# Patient Record
Sex: MALE | Race: WHITE | NOT HISPANIC OR LATINO | Employment: STUDENT | URBAN - NONMETROPOLITAN AREA
[De-identification: names, ages, dates, MRNs, and addresses within clinical notes are randomized per-mention and may not be internally consistent; named-entity substitution may affect disease eponyms.]

---
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
## 2022-08-19 ENCOUNTER — HOSPITAL ENCOUNTER (OUTPATIENT)
Dept: GENERAL RADIOLOGY | Facility: OTHER | Age: 22
Discharge: HOME | End: 2022-08-19
Attending: NURSE PRACTITIONER
Payer: COMMERCIAL

## 2022-08-19 ENCOUNTER — HOSPITAL ENCOUNTER (OUTPATIENT)
Dept: MRI IMAGING | Facility: OTHER | Age: 22
Discharge: HOME | End: 2022-08-19
Attending: NURSE PRACTITIONER
Payer: COMMERCIAL

## 2022-08-19 DIAGNOSIS — M25.311 INSTABILITY OF RIGHT SHOULDER JOINT: ICD-10-CM

## 2022-08-19 DIAGNOSIS — S43.439A SLAP (SUPERIOR GLENOID LABRUM LESION): ICD-10-CM

## 2022-08-19 PROCEDURE — 250N000009 HC RX 250: Performed by: STUDENT IN AN ORGANIZED HEALTH CARE EDUCATION/TRAINING PROGRAM

## 2022-08-19 PROCEDURE — 73222 MRI JOINT UPR EXTREM W/DYE: CPT | Mod: RT

## 2022-08-19 PROCEDURE — 255N000002 HC RX 255 OP 636: Performed by: STUDENT IN AN ORGANIZED HEALTH CARE EDUCATION/TRAINING PROGRAM

## 2022-08-19 PROCEDURE — 23350 INJECTION FOR SHOULDER X-RAY: CPT

## 2022-08-19 PROCEDURE — A9575 INJ GADOTERATE MEGLUMI 0.1ML: HCPCS | Performed by: STUDENT IN AN ORGANIZED HEALTH CARE EDUCATION/TRAINING PROGRAM

## 2022-08-19 RX ORDER — GADOTERATE MEGLUMINE 376.9 MG/ML
0.1 INJECTION INTRAVENOUS ONCE
Status: COMPLETED | OUTPATIENT
Start: 2022-08-19 | End: 2022-08-19

## 2022-08-19 RX ORDER — LIDOCAINE HYDROCHLORIDE 10 MG/ML
1 INJECTION, SOLUTION INFILTRATION; PERINEURAL ONCE
Status: COMPLETED | OUTPATIENT
Start: 2022-08-19 | End: 2022-08-19

## 2022-08-19 RX ADMIN — IOHEXOL 1 ML: 240 INJECTION, SOLUTION INTRATHECAL; INTRAVASCULAR; INTRAVENOUS; ORAL at 10:50

## 2022-08-19 RX ADMIN — LIDOCAINE HYDROCHLORIDE 3 ML: 10 INJECTION, SOLUTION INFILTRATION; PERINEURAL at 10:51

## 2022-08-19 RX ADMIN — GADOTERATE MEGLUMINE 0.1 ML: 376.9 INJECTION INTRAVENOUS at 10:51

## 2025-04-30 NOTE — PROGRESS NOTES
"Patient Information     Patient Name MRN Sex Daniel Dowling [id number] Male 2000      Progress Notes by Artis Monroy PT at 2017  7:42 AM     Author:  Artis Monroy PT Service:  (none) Author Type:  PT- Physical Therapist     Filed:  2017  8:12 AM Date of Service:  2017  7:42 AM Status:  Signed     :  Artis Monroy PT (PT- Physical Therapist)            [facility]  Outpatient PT - Daily Notes     Date of Service: 2017      Visit 4/10    Patient Name: Daniel Orosco   YOB: 2000   Referring MD/Provider: Dr. Contreras   Medical and Treatment Diagnosis: Achilles tendinitis of both lower extremities   PT Treatment Diagnosis: Diminished balance and pain of bilateral ankles.   Insurance:  Care   Start of Service: 16  Certification Dates: Start of Service: 16  Medicare/MA Re-Cert Due: 17           Subjective      Patient reports symptoms have improved 75% overall.  He still has a few episodes of pain after skiing.  States the use of ice does help decrease symptoms.  He has used orthotics in his ski boots which has been helpful. Reports no difficulty with the current HEP.      Rates pain: 0 /10            Objective    Today's Intervention:    6 minute warm up on the bike. Seat 5    -Star diagram  -Squats: focus on keeping chest up and maintaining knees over toes, next added squat on the Bosu ball x 10 with 5\" hold focus on technique  -Crab walk with green theraband    Added:   -Sidelying hip abduction x 15 B     Ice massage: pt performed bilateral ice massage on Roney's tendon and insertion.     Home Exercise Program:    -Towel scrunches  -Single leg stance with arch in foot  -Ice massage after ski practice  -Single leg stance (SLS) while maintaining arch on both legs while doing bilateral shoulder extension against red TB   -SLS while maintaining arch on both legs while doing alternating shoulder extension against red TB " Report and care given to Sari Colvin RN.   "  -SLS while maintaining arch on both legs while doing diagonal pattern from shoulder down to hip against doubled-up red TB   -Star diagram: instructed to keep chest up   -Squats: focus on keeping chest up and maintaining knees over toes  -Crab walk with green theraband  Sidelying hip abduction     Assessment    Therapist Assessment / Clinical Impression:  Signs and symptoms consistent with Achilles tendinitis of both lower extremities     The patient is appropriate for physical therapy to address their pain, functional limitations, and physical impairments.     Functional Impairment(s): See subjective on initial evaluation and Functional Assessment / Summary Report from [facility].    Physical Impairment(s): Diminished balance and pain of bilateral ankles.           Goals:  Functional Short Term Goals (4 weeks):   Patient will walk with a normal gait pattern at the end of the day when his pain is increased.   Patient will be able to maintain an arch while in single leg stance bilaterally for 30 seconds.   Patient will report that he has no pain with skate skiing uphill during ski practice.       Functional Long Term Goals (8 weeks):   Patient will self-manage symptoms and return to prior level of function.    Patient will be independent in their Home Exercise Program.  Patient will report less than 2/10 pain at the end of the day while ambulating.   Patient will demonstrate a 4\" step down bilaterally without valgus knee positioning.   Patient will be able to maintain an arch while in single leg stance while standing on airex foam bilaterally for 20 seconds.         Patient participated in goal selection and understand(s) the plan of care: Yes   Patient Potential for Achieving Desired Outcome: Good       Response to Intervention:  Good response to treatment. Patient verbalizes understanding of HEP.         Plan    Treatment Plan:      Frequency:   16 visits    Duration of Treatment: 8 weeks    Planned Interventions:  "   Home Exercise Program development  Therapeutic Exercise (ROM & Strengthening)  Therapeutic Activities  Neuromuscular Re-education  Manual Therapy  Ultrasound  E-Stim  Gait Training  Hot Packs / Cold Pack Modalities  Vasopneumatic Compression with Cold Therapy ( Game Ready )  Iontophoresis with Dexamethasone    Plan for next visit:  Progress balance and strengthening exercises as tolerated, utilize manual therapy and modalities as indicated.

## (undated) RX ORDER — LIDOCAINE HYDROCHLORIDE 10 MG/ML
INJECTION, SOLUTION INFILTRATION; PERINEURAL
Status: DISPENSED
Start: 2022-08-19

## (undated) RX ORDER — KETOROLAC TROMETHAMINE 30 MG/ML
INJECTION, SOLUTION INTRAMUSCULAR; INTRAVENOUS
Status: DISPENSED
Start: 2021-05-22

## (undated) RX ORDER — LIDOCAINE HYDROCHLORIDE 10 MG/ML
INJECTION, SOLUTION INFILTRATION; PERINEURAL
Status: DISPENSED
Start: 2021-01-22

## (undated) RX ORDER — IBUPROFEN 400 MG/1
TABLET, FILM COATED ORAL
Status: DISPENSED
Start: 2021-05-22